# Patient Record
Sex: FEMALE | Race: WHITE
[De-identification: names, ages, dates, MRNs, and addresses within clinical notes are randomized per-mention and may not be internally consistent; named-entity substitution may affect disease eponyms.]

---

## 2020-02-17 ENCOUNTER — NURSE TRIAGE (OUTPATIENT)
Dept: OTHER | Facility: CLINIC | Age: 40
End: 2020-02-17

## 2020-02-17 NOTE — TELEPHONE ENCOUNTER
Reason for Disposition   [1] Continuous (nonstop) coughing interferes with work or school AND [2] no improvement using cough treatment per Care Advice    Protocols used: COUGH - ACUTE PRODUCTIVE-ADULT-AH    Patient calling on [employer] line. Patient calling with c/o of cough with production of thick white colored sputum that started on 2/12 and nasal congestion. The patient denies a fever at this time or difficulty breathing. The patient is not wheezing at [facility] time. The patient does report high abdominal pain in the center of the abdomen that she denies is chest pain. Protocol recommendation to be seen in the next 24 hours. Care advice shared. Patient will call PCP for an appointment to be seen.